# Patient Record
Sex: FEMALE | Race: ASIAN | NOT HISPANIC OR LATINO | URBAN - METROPOLITAN AREA
[De-identification: names, ages, dates, MRNs, and addresses within clinical notes are randomized per-mention and may not be internally consistent; named-entity substitution may affect disease eponyms.]

---

## 2019-11-12 ENCOUNTER — TELEPHONE (OUTPATIENT)
Dept: OBSTETRICS AND GYNECOLOGY | Facility: CLINIC | Age: 29
End: 2019-11-12

## 2019-11-12 DIAGNOSIS — N91.2 AMENORRHEA: Primary | ICD-10-CM

## 2019-11-13 ENCOUNTER — LAB VISIT (OUTPATIENT)
Dept: LAB | Facility: OTHER | Age: 29
End: 2019-11-13
Attending: NURSE PRACTITIONER
Payer: COMMERCIAL

## 2019-11-13 ENCOUNTER — OFFICE VISIT (OUTPATIENT)
Dept: OBSTETRICS AND GYNECOLOGY | Facility: CLINIC | Age: 29
End: 2019-11-13
Payer: COMMERCIAL

## 2019-11-13 VITALS
HEIGHT: 65 IN | DIASTOLIC BLOOD PRESSURE: 74 MMHG | SYSTOLIC BLOOD PRESSURE: 114 MMHG | BODY MASS INDEX: 28.87 KG/M2 | WEIGHT: 173.31 LBS

## 2019-11-13 DIAGNOSIS — Z32.01 POSITIVE PREGNANCY TEST: ICD-10-CM

## 2019-11-13 DIAGNOSIS — Z36.82 ENCOUNTER FOR (NT) NUCHAL TRANSLUCENCY SCAN: ICD-10-CM

## 2019-11-13 DIAGNOSIS — N91.5 OLIGOMENORRHEA, UNSPECIFIED TYPE: Primary | ICD-10-CM

## 2019-11-13 DIAGNOSIS — N91.5 OLIGOMENORRHEA, UNSPECIFIED TYPE: ICD-10-CM

## 2019-11-13 LAB
ABO + RH BLD: NORMAL
BASOPHILS # BLD AUTO: 0.04 K/UL (ref 0–0.2)
BASOPHILS NFR BLD: 0.5 % (ref 0–1.9)
BLD GP AB SCN CELLS X3 SERPL QL: NORMAL
DIFFERENTIAL METHOD: ABNORMAL
EOSINOPHIL # BLD AUTO: 0.1 K/UL (ref 0–0.5)
EOSINOPHIL NFR BLD: 1.6 % (ref 0–8)
ERYTHROCYTE [DISTWIDTH] IN BLOOD BY AUTOMATED COUNT: 19.7 % (ref 11.5–14.5)
HCG INTACT+B SERPL-ACNC: 206 MIU/ML
HCT VFR BLD AUTO: 38.9 % (ref 37–48.5)
HGB BLD-MCNC: 12 G/DL (ref 12–16)
IMM GRANULOCYTES # BLD AUTO: 0.03 K/UL (ref 0–0.04)
IMM GRANULOCYTES NFR BLD AUTO: 0.4 % (ref 0–0.5)
LYMPHOCYTES # BLD AUTO: 2.7 K/UL (ref 1–4.8)
LYMPHOCYTES NFR BLD: 33.7 % (ref 18–48)
MCH RBC QN AUTO: 17.6 PG (ref 27–31)
MCHC RBC AUTO-ENTMCNC: 30.8 G/DL (ref 32–36)
MCV RBC AUTO: 57 FL (ref 82–98)
MONOCYTES # BLD AUTO: 0.5 K/UL (ref 0.3–1)
MONOCYTES NFR BLD: 6.2 % (ref 4–15)
NEUTROPHILS # BLD AUTO: 4.7 K/UL (ref 1.8–7.7)
NEUTROPHILS NFR BLD: 57.6 % (ref 38–73)
NRBC BLD-RTO: 0 /100 WBC
PLATELET # BLD AUTO: 259 K/UL (ref 150–350)
PMV BLD AUTO: ABNORMAL FL (ref 9.2–12.9)
RBC # BLD AUTO: 6.81 M/UL (ref 4–5.4)
WBC # BLD AUTO: 8.13 K/UL (ref 3.9–12.7)

## 2019-11-13 PROCEDURE — 86703 HIV-1/HIV-2 1 RESULT ANTBDY: CPT

## 2019-11-13 PROCEDURE — 87340 HEPATITIS B SURFACE AG IA: CPT

## 2019-11-13 PROCEDURE — 99204 PR OFFICE/OUTPT VISIT, NEW, LEVL IV, 45-59 MIN: ICD-10-PCS | Mod: S$GLB,,, | Performed by: NURSE PRACTITIONER

## 2019-11-13 PROCEDURE — 99204 OFFICE O/P NEW MOD 45 MIN: CPT | Mod: S$GLB,,, | Performed by: NURSE PRACTITIONER

## 2019-11-13 PROCEDURE — 85025 COMPLETE CBC W/AUTO DIFF WBC: CPT

## 2019-11-13 PROCEDURE — 99999 PR PBB SHADOW E&M-EST. PATIENT-LVL III: ICD-10-PCS | Mod: PBBFAC,,, | Performed by: NURSE PRACTITIONER

## 2019-11-13 PROCEDURE — 86850 RBC ANTIBODY SCREEN: CPT

## 2019-11-13 PROCEDURE — 86762 RUBELLA ANTIBODY: CPT

## 2019-11-13 PROCEDURE — 86592 SYPHILIS TEST NON-TREP QUAL: CPT

## 2019-11-13 PROCEDURE — 99999 PR PBB SHADOW E&M-EST. PATIENT-LVL III: CPT | Mod: PBBFAC,,, | Performed by: NURSE PRACTITIONER

## 2019-11-13 PROCEDURE — 87491 CHLMYD TRACH DNA AMP PROBE: CPT

## 2019-11-13 PROCEDURE — 84702 CHORIONIC GONADOTROPIN TEST: CPT

## 2019-11-13 PROCEDURE — 36415 COLL VENOUS BLD VENIPUNCTURE: CPT

## 2019-11-13 NOTE — PROGRESS NOTES
"  CC: Positive Pregnancy Test    HISTORY OF PRESENT ILLNESS:    Lorna Mcdonnell is a 29 y.o. female, ,  Presents today for a routine exam complaining of amenorrhea and positive home urine pregnancy test.  Patient's last menstrual period was 10/01/2019 (approximate).   She is not currently on any contraception.  Reports nausea. Reports breast tenderness. Denies vaginal bleeding and pelvic pain.  Pt with h/o hidradenitis of axilla.   Prior  X 1 - full term/ uncomplicated pregnancy. Delivered at [facility].   Works in [employer] for [employer].       ROS:  GENERAL: No weight changes. No swelling. No fatigue. No fever.  CARDIOVASCULAR: No chest pain. No shortness of breath. No leg cramps.   NEUROLOGICAL: No headaches. No vision changes.  BREASTS: No pain. No lumps. No discharge.  ABDOMEN: No pain. No diarrhea. No constipation.  REPRODUCTIVE: No abnormal bleeding.   VULVA: No pain. No lesions. No itching.  VAGINA: No relaxation. No itching. No odor. No discharge. No lesions.  URINARY: No incontinence. No nocturia. No frequency. No dysuria.    MEDICATIONS AND ALLERGIES:  Reviewed        COMPREHENSIVE GYN HISTORY:  PAP History: Denies abnormal Paps.  Infection History: Denies STDs. Denies PID.  Benign History: Denies uterine fibroids. Denies ovarian cysts. Denies endometriosis. Denies other conditions.  Cancer History: Denies cervical cancer. Denies uterine cancer or hyperplasia. Denies ovarian cancer. Denies vulvar cancer or pre-cancer. Denies vaginal cancer or pre-cancer. Denies breast cancer. Denies colon cancer.  Sexual Activity History: Reports currently being sexually active  Menstrual History: None.  Contraception: None    /74 (BP Location: Right arm, Patient Position: Sitting)   Ht 5' 5" (1.651 m)   Wt 78.6 kg (173 lb 4.5 oz)   LMP 10/01/2019 (Approximate)   BMI 28.84 kg/m²     PE:  AFFECT: Calm, alert and oriented X 3. Interactive during exam  GENERAL: Appears well-nourished, well-developed, in no " acute distress.  HEAD: Normocephalic, atruamatic  TEETH: Good dentition.  THYROID: No thyromegally   BREASTS: No masses, skin changes, nipple discharge or adenopathy bilaterally.  SKIN: Normal for race, warm, & dry. No lesions or rashes.  LUNGS: Easy and unlabored, clear to auscultation bilaterally.  HEART: Regular rate and rhythm   ABDOMEN: Soft and nontender without masses or organomegally.  VULVA: No lesions, masses or tenderness.  VAGINA: Moist and well rugated without lesions or discharge.  CERVIX: Moist and pink without lesions, discharge or tenderness.      UTERUS SIZE: 6 week size, nontender and without masses.  ADNEXA: No masses or tenderness.  ESTIMATE OF PELVIC CAPACITY: Adequate  EXTREMITIES: No cyanosis, clubbing or edema. No calf tenderness.  LYMPH NODES: No axillary or inguinal adenopathy.    PROCEDURES:  UPT Positive  Genprobe  Pap      ASSESSMENT/PLAN:  Amenorrhea  Positive urine pregnancy test (CHEY: unknown)    -  Routine prenatal care    Nausea and vomiting in pregnancy    -  Education regarding lifestyle and dietary modifications    -  Advised use of B6/Unisom. Pt will notify us if no relief/worsening symptoms, will consider Zofran if needed.      1st TRIMESTER COUNSELING: Discussed all, booklet provided:  Common complaints of pregnancy  HIV and other routine prenatal tests including  genetic screening  Risk factors identified by prenatal history  Oriented to practice - discussed anticipated course of prenatal care & indications for Ultrasound  Childbirth classes/Hospital facilities   Nutrition and weight gain counseling  Toxoplasmosis precautions (Cats/Raw Meat)  Sexual activity and exercise  Environmental/Work hazards  Travel  Tobacco (Ask, Advise, Assess, Assist, and Arrange), as well as alcohol and drug use  Use of any medications (Including supplements, Vitamins, Herbs, or OTC Drugs)  Domestic violence  Seat belt use      TERATOLOGY COUNSELING:   Discussed indications and options  for aneuploidy screening - pamphlets given    -  Pt desires NT and orders placed    Dating US next week   FOLLOW-UP in 4 weeks with Dr. Isidra Michael, NP    OB/GYN

## 2019-11-14 ENCOUNTER — TELEPHONE (OUTPATIENT)
Dept: OBSTETRICS AND GYNECOLOGY | Facility: CLINIC | Age: 29
End: 2019-11-14

## 2019-11-14 DIAGNOSIS — N91.5 OLIGOMENORRHEA, UNSPECIFIED TYPE: Primary | ICD-10-CM

## 2019-11-14 DIAGNOSIS — Z32.01 POSITIVE PREGNANCY TEST: ICD-10-CM

## 2019-11-14 LAB
HBV SURFACE AG SERPL QL IA: NEGATIVE
HIV 1+2 AB+HIV1 P24 AG SERPL QL IA: NEGATIVE
RPR SER QL: NORMAL
RUBV IGG SER-ACNC: 24.4 IU/ML
RUBV IGG SER-IMP: REACTIVE

## 2019-11-15 LAB
C TRACH DNA SPEC QL NAA+PROBE: NOT DETECTED
N GONORRHOEA DNA SPEC QL NAA+PROBE: NOT DETECTED

## 2019-11-22 ENCOUNTER — TELEPHONE (OUTPATIENT)
Dept: OBSTETRICS AND GYNECOLOGY | Facility: CLINIC | Age: 29
End: 2019-11-22

## 2019-11-22 ENCOUNTER — LAB VISIT (OUTPATIENT)
Dept: LAB | Facility: OTHER | Age: 29
End: 2019-11-22
Payer: COMMERCIAL

## 2019-11-22 ENCOUNTER — PROCEDURE VISIT (OUTPATIENT)
Dept: OBSTETRICS AND GYNECOLOGY | Facility: CLINIC | Age: 29
End: 2019-11-22
Payer: COMMERCIAL

## 2019-11-22 DIAGNOSIS — N91.2 AMENORRHEA: ICD-10-CM

## 2019-11-22 DIAGNOSIS — O20.0 THREATENED ABORTION: Primary | ICD-10-CM

## 2019-11-22 DIAGNOSIS — O20.0 THREATENED ABORTION: ICD-10-CM

## 2019-11-22 LAB — HCG INTACT+B SERPL-ACNC: 4316 MIU/ML

## 2019-11-22 PROCEDURE — 36415 COLL VENOUS BLD VENIPUNCTURE: CPT

## 2019-11-22 PROCEDURE — 76801 OB US < 14 WKS SINGLE FETUS: CPT | Mod: S$GLB,,, | Performed by: OBSTETRICS & GYNECOLOGY

## 2019-11-22 PROCEDURE — 84702 CHORIONIC GONADOTROPIN TEST: CPT

## 2019-11-22 PROCEDURE — 76801 PR US, OB <14WKS, TRANSABD, SINGLE GESTATION: ICD-10-PCS | Mod: S$GLB,,, | Performed by: OBSTETRICS & GYNECOLOGY

## 2019-11-22 NOTE — TELEPHONE ENCOUNTER
----- Message from Natalie Saenz sent at 11/22/2019  4:49 PM CST -----  Contact: ALAN FREEMAN [[id number]]   Name of Who is Calling:     What is the request in detail:  Patient request call back  State she has another question Please contact to further discuss and advise      Can the clinic reply by [facility]: no     What Number to Call Back if not in [facility]:

## 2019-11-22 NOTE — TELEPHONE ENCOUNTER
----- Message from Martita Bhakta sent at 11/22/2019  3:43 PM CST -----  Contact: ALAN FREEMAN   Type:  Patient Returning Call    Who Called: ALAN FREEMAN     Who Left Message for Patient: Janet Patiño MA    Does the patient know what this is regarding?: NO    Best Call Back Number:[phone]    Additional Information:

## 2019-11-22 NOTE — TELEPHONE ENCOUNTER
"Per Becka NP, "US had a gestational sac with no heartbeat.  Hcg level did increase from last week.  We need more info to determine the status of the pregnancy."    Patient is already scheduled for her follow up ultrasound and wanted to know if she needed another HCG drawn. Patient advised that the note only called for a follow up ultrasound. Patient verbalized understanding.     "

## 2019-12-03 ENCOUNTER — PROCEDURE VISIT (OUTPATIENT)
Dept: OBSTETRICS AND GYNECOLOGY | Facility: CLINIC | Age: 29
End: 2019-12-03
Payer: COMMERCIAL

## 2019-12-03 DIAGNOSIS — O20.0 THREATENED ABORTION: ICD-10-CM

## 2019-12-03 PROCEDURE — 76801 OB US < 14 WKS SINGLE FETUS: CPT | Mod: S$GLB,,, | Performed by: OBSTETRICS & GYNECOLOGY

## 2019-12-03 PROCEDURE — 76801 PR US, OB <14WKS, TRANSABD, SINGLE GESTATION: ICD-10-PCS | Mod: S$GLB,,, | Performed by: OBSTETRICS & GYNECOLOGY

## 2019-12-19 ENCOUNTER — ROUTINE PRENATAL (OUTPATIENT)
Dept: OBSTETRICS AND GYNECOLOGY | Facility: CLINIC | Age: 29
End: 2019-12-19
Payer: COMMERCIAL

## 2019-12-19 VITALS
BODY MASS INDEX: 28.69 KG/M2 | DIASTOLIC BLOOD PRESSURE: 80 MMHG | WEIGHT: 172.38 LBS | SYSTOLIC BLOOD PRESSURE: 104 MMHG

## 2019-12-19 DIAGNOSIS — Z86.39 HISTORY OF HYPOTHYROIDISM: ICD-10-CM

## 2019-12-19 DIAGNOSIS — O21.9 NAUSEA AND VOMITING IN PREGNANCY: Primary | ICD-10-CM

## 2019-12-19 DIAGNOSIS — O26.891 PREGNANCY HEADACHE IN FIRST TRIMESTER: ICD-10-CM

## 2019-12-19 DIAGNOSIS — R51.9 PREGNANCY HEADACHE IN FIRST TRIMESTER: ICD-10-CM

## 2019-12-19 PROCEDURE — 99999 PR PBB SHADOW E&M-EST. PATIENT-LVL II: CPT | Mod: PBBFAC,,, | Performed by: OBSTETRICS & GYNECOLOGY

## 2019-12-19 PROCEDURE — 99999 PR PBB SHADOW E&M-EST. PATIENT-LVL II: ICD-10-PCS | Mod: PBBFAC,,, | Performed by: OBSTETRICS & GYNECOLOGY

## 2019-12-19 RX ORDER — ONDANSETRON 4 MG/1
4 TABLET, FILM COATED ORAL DAILY PRN
Qty: 30 TABLET | Refills: 2 | Status: SHIPPED | OUTPATIENT
Start: 2019-12-19 | End: 2020-12-18

## 2019-12-19 RX ORDER — BUTALBITAL, ACETAMINOPHEN AND CAFFEINE 50; 325; 40 MG/1; MG/1; MG/1
1 TABLET ORAL EVERY 4 HOURS PRN
Qty: 30 TABLET | Refills: 0 | Status: SHIPPED | OUTPATIENT
Start: 2019-12-19 | End: 2020-01-18

## 2019-12-19 NOTE — PROGRESS NOTES
Pt is a  who is here for initial OB visit.  Pt reports doing ok.  Reports that she is having headaches often, not relieved with tylenol.  Last pregnancy had HAs that were relieved with tylenol and caffeine.  Will give Rx for fioricet.  Pt is also having nausea.  Pt would like to try Zofran.  Rx sent.  Also discussed B6/Unisom which patient will try as well.  Pt reports hx of hypothyroidism in last pregnancy.  Had to be on synthroid.  Will check TSH today.  RTC in 4 weeks.  NT ordered for chromosome screening.

## 2019-12-26 ENCOUNTER — PATIENT MESSAGE (OUTPATIENT)
Dept: MATERNAL FETAL MEDICINE | Facility: CLINIC | Age: 29
End: 2019-12-26

## 2020-01-09 ENCOUNTER — TELEPHONE (OUTPATIENT)
Dept: OBSTETRICS AND GYNECOLOGY | Facility: CLINIC | Age: 30
End: 2020-01-09

## 2020-01-09 NOTE — TELEPHONE ENCOUNTER
----- Message from Paxton Chan, Patient Care Assistant sent at 1/9/2020 10:33 AM CST -----  Contact: ALAN FREEMAN [[id number]]  Name of Who is Calling: ALAN FREEMAN [[id number]]    What is the request in detail:Requesting to come in 01/13/20 if available for routine Ob.  Please contact to further discuss and advise      Can the clinic reply by [facility]: No    [facility]:   [phone]

## 2020-01-13 ENCOUNTER — ROUTINE PRENATAL (OUTPATIENT)
Dept: OBSTETRICS AND GYNECOLOGY | Facility: CLINIC | Age: 30
End: 2020-01-13
Payer: COMMERCIAL

## 2020-01-13 VITALS
DIASTOLIC BLOOD PRESSURE: 74 MMHG | SYSTOLIC BLOOD PRESSURE: 104 MMHG | BODY MASS INDEX: 27.77 KG/M2 | WEIGHT: 166.88 LBS

## 2020-01-13 DIAGNOSIS — O99.612 CONSTIPATION DURING PREGNANCY IN SECOND TRIMESTER: ICD-10-CM

## 2020-01-13 DIAGNOSIS — K59.00 CONSTIPATION DURING PREGNANCY IN SECOND TRIMESTER: ICD-10-CM

## 2020-01-13 DIAGNOSIS — Z34.82 ENCOUNTER FOR SUPERVISION OF OTHER NORMAL PREGNANCY IN SECOND TRIMESTER: Primary | ICD-10-CM

## 2020-01-13 PROCEDURE — 99999 PR PBB SHADOW E&M-EST. PATIENT-LVL II: ICD-10-PCS | Mod: PBBFAC,,, | Performed by: OBSTETRICS & GYNECOLOGY

## 2020-01-13 PROCEDURE — 99999 PR PBB SHADOW E&M-EST. PATIENT-LVL II: CPT | Mod: PBBFAC,,, | Performed by: OBSTETRICS & GYNECOLOGY

## 2020-01-13 RX ORDER — DOCUSATE SODIUM 100 MG/1
100 CAPSULE, LIQUID FILLED ORAL 2 TIMES DAILY
Qty: 60 CAPSULE | Refills: 1 | Status: SHIPPED | OUTPATIENT
Start: 2020-01-13 | End: 2021-01-12

## 2020-01-13 NOTE — PROGRESS NOTES
Pt reports that HAs have improved and resolved.  Notes that nausea is improving.  Pt using Bonjesta PRN.  Rarely using Zofran.  Notes constipation.  Rx for Colace BID given.  Otherwise doing well.  RTC in 4 weeks,.  Will do connected mom

## 2020-01-16 ENCOUNTER — PROCEDURE VISIT (OUTPATIENT)
Dept: MATERNAL FETAL MEDICINE | Facility: CLINIC | Age: 30
End: 2020-01-16
Payer: COMMERCIAL

## 2020-01-16 ENCOUNTER — LAB VISIT (OUTPATIENT)
Dept: LAB | Facility: OTHER | Age: 30
End: 2020-01-16
Attending: OBSTETRICS & GYNECOLOGY
Payer: COMMERCIAL

## 2020-01-16 VITALS — WEIGHT: 165.81 LBS | BODY MASS INDEX: 27.59 KG/M2

## 2020-01-16 DIAGNOSIS — Z36.9 ENCOUNTER FOR ANTENATAL SCREENING: ICD-10-CM

## 2020-01-16 DIAGNOSIS — Z86.39 HISTORY OF HYPOTHYROIDISM: ICD-10-CM

## 2020-01-16 DIAGNOSIS — Z36.89 ENCOUNTER FOR FETAL ANATOMIC SURVEY: Primary | ICD-10-CM

## 2020-01-16 DIAGNOSIS — Z36.82 ENCOUNTER FOR (NT) NUCHAL TRANSLUCENCY SCAN: ICD-10-CM

## 2020-01-16 LAB — TSH SERPL DL<=0.005 MIU/L-ACNC: 1.48 UIU/ML (ref 0.4–4)

## 2020-01-16 PROCEDURE — 76813 OB US NUCHAL MEAS 1 GEST: CPT | Mod: S$GLB,,, | Performed by: OBSTETRICS & GYNECOLOGY

## 2020-01-16 PROCEDURE — 76813 PR US, OB NUCHAL, TRANSABDOM/TRANSVAG, FIRST GESTATION: ICD-10-PCS | Mod: S$GLB,,, | Performed by: OBSTETRICS & GYNECOLOGY

## 2020-01-16 PROCEDURE — 84443 ASSAY THYROID STIM HORMONE: CPT

## 2020-01-16 PROCEDURE — 84163 PAPPA SERUM: CPT

## 2020-01-16 PROCEDURE — 36415 COLL VENOUS BLD VENIPUNCTURE: CPT

## 2020-01-20 LAB — INTEGRATED SCN PATIENT-IMP NAR: NORMAL

## 2020-01-21 ENCOUNTER — TELEPHONE (OUTPATIENT)
Dept: OBSTETRICS AND GYNECOLOGY | Facility: CLINIC | Age: 30
End: 2020-01-21

## 2020-01-21 NOTE — TELEPHONE ENCOUNTER
----- Message from Arin Robison sent at 1/21/2020 12:41 PM CST -----  Contact: ALAN FREEMAN  Name of Who is Calling: ALAN FREEMAN      What is the request in detail: Would like to speak to staff in regards to wanting to review her results due to the portal being down. Please advise.       Can the clinic reply by [facility]: No      What Number to Call Back if not in [facility]: [phone]

## 2020-01-29 NOTE — TELEPHONE ENCOUNTER
Attempted to call patient to discuss NT.  No answer.  Unable to leave voicemail.  Will try again later.

## 2020-01-31 ENCOUNTER — PATIENT MESSAGE (OUTPATIENT)
Dept: ADMINISTRATIVE | Facility: OTHER | Age: 30
End: 2020-01-31

## 2020-03-06 ENCOUNTER — PROCEDURE VISIT (OUTPATIENT)
Dept: MATERNAL FETAL MEDICINE | Facility: CLINIC | Age: 30
End: 2020-03-06
Payer: COMMERCIAL

## 2020-03-06 ENCOUNTER — LAB VISIT (OUTPATIENT)
Dept: LAB | Facility: OTHER | Age: 30
End: 2020-03-06
Attending: OBSTETRICS & GYNECOLOGY
Payer: COMMERCIAL

## 2020-03-06 ENCOUNTER — PATIENT MESSAGE (OUTPATIENT)
Dept: OBSTETRICS AND GYNECOLOGY | Facility: CLINIC | Age: 30
End: 2020-03-06

## 2020-03-06 VITALS — BODY MASS INDEX: 26.6 KG/M2 | WEIGHT: 159.81 LBS

## 2020-03-06 DIAGNOSIS — Z36.9 ENCOUNTER FOR ANTENATAL SCREENING: ICD-10-CM

## 2020-03-06 DIAGNOSIS — Z36.9 ENCOUNTER FOR ANTENATAL SCREENING: Primary | ICD-10-CM

## 2020-03-06 DIAGNOSIS — Z36.89 ENCOUNTER FOR FETAL ANATOMIC SURVEY: ICD-10-CM

## 2020-03-06 PROCEDURE — 76805 PR US, OB 14+WKS, TRANSABD, SINGLE GESTATION: ICD-10-PCS | Mod: S$GLB,,, | Performed by: OBSTETRICS & GYNECOLOGY

## 2020-03-06 PROCEDURE — 76805 OB US >/= 14 WKS SNGL FETUS: CPT | Mod: S$GLB,,, | Performed by: OBSTETRICS & GYNECOLOGY

## 2020-03-06 PROCEDURE — 36415 COLL VENOUS BLD VENIPUNCTURE: CPT

## 2020-03-09 LAB
# FETUSES US: NORMAL
AFP MOM SERPL: 1.07
AFP SERPL-MCNC: 67.1 NG/ML
AGE AT DELIVERY: 29
B-HCG MOM SERPL: 1.25
B-HCG SERPL-ACNC: 20.4 IU/ML
COLLECT DATE BLD: NORMAL
COLLECT DATE: NORMAL
FET TS 21 RISK FROM MAT AGE: NORMAL
GA (DAYS): 4 D
GA (WEEKS): 13 WK
GA METHOD: NORMAL
GEST. AGE (DAYS) 2ND SAMPLE (SI2): 5
GEST. AGE (WKS) 2ND SAMPLE (SI2): 20
IDDM PATIENT QL: NORMAL
INHIBIN A MOM SERPL: 1.08
INHIBIN A SERPL-MCNC: 202.4 PG/ML
INTEGRATED SCN PATIENT-IMP NAR: NORMAL
INTEGRATED SCN PATIENT-IMP: NEGATIVE
PAPP-A MOM SERPL: 0.86
PAPP-A SERPL-MCNC: NORMAL NG/ML
SMOKING STATUS FTND: NO
TS 18 RISK FETUS: NORMAL
TS 21 RISK FETUS: NORMAL
U ESTRIOL MOM SERPL: 0.7
U ESTRIOL SERPL-MCNC: 1.66 NG/ML

## 2020-03-26 ENCOUNTER — PATIENT MESSAGE (OUTPATIENT)
Dept: OBSTETRICS AND GYNECOLOGY | Facility: CLINIC | Age: 30
End: 2020-03-26

## 2020-03-27 ENCOUNTER — TELEPHONE (OUTPATIENT)
Dept: OBSTETRICS AND GYNECOLOGY | Facility: CLINIC | Age: 30
End: 2020-03-27

## 2020-03-27 DIAGNOSIS — Z34.82 ENCOUNTER FOR SUPERVISION OF OTHER NORMAL PREGNANCY IN SECOND TRIMESTER: Primary | ICD-10-CM

## 2020-03-27 NOTE — TELEPHONE ENCOUNTER
----- Message from Barbra Coyle sent at 3/27/2020 12:28 PM CDT -----  Contact: ALAN FREEMAN [[id number]]  Name of Who is Calling: ALAN FREEMAN [[id number]]      What is the request in detail: patient would like orders for glucose testing. Please call      Can the clinic reply by [facility]: yes    What Number to Call Back if not in [facility]: [phone] (home)

## 2020-03-30 ENCOUNTER — PATIENT MESSAGE (OUTPATIENT)
Dept: OBSTETRICS AND GYNECOLOGY | Facility: CLINIC | Age: 30
End: 2020-03-30

## 2020-03-31 ENCOUNTER — TELEPHONE (OUTPATIENT)
Dept: OBSTETRICS AND GYNECOLOGY | Facility: CLINIC | Age: 30
End: 2020-03-31

## 2020-03-31 ENCOUNTER — OFFICE VISIT (OUTPATIENT)
Dept: OBSTETRICS AND GYNECOLOGY | Facility: CLINIC | Age: 30
End: 2020-03-31
Payer: COMMERCIAL

## 2020-03-31 VITALS — SYSTOLIC BLOOD PRESSURE: 105 MMHG | BODY MASS INDEX: 26.79 KG/M2 | WEIGHT: 161 LBS | DIASTOLIC BLOOD PRESSURE: 65 MMHG

## 2020-03-31 DIAGNOSIS — Z34.82 ENCOUNTER FOR SUPERVISION OF OTHER NORMAL PREGNANCY IN SECOND TRIMESTER: Primary | ICD-10-CM

## 2020-03-31 NOTE — TELEPHONE ENCOUNTER
Attempted to contact patient regarding having virtual visit earlier around 11 am -1pm. Phone went straight vm without ringing. Voicemail left. MyChart sent as well.

## 2020-04-18 ENCOUNTER — PATIENT MESSAGE (OUTPATIENT)
Dept: OBSTETRICS AND GYNECOLOGY | Facility: CLINIC | Age: 30
End: 2020-04-18

## 2020-04-24 ENCOUNTER — CLINICAL SUPPORT (OUTPATIENT)
Dept: OBSTETRICS AND GYNECOLOGY | Facility: CLINIC | Age: 30
End: 2020-04-24
Payer: COMMERCIAL

## 2020-04-24 ENCOUNTER — LAB VISIT (OUTPATIENT)
Dept: LAB | Facility: OTHER | Age: 30
End: 2020-04-24
Attending: OBSTETRICS & GYNECOLOGY
Payer: COMMERCIAL

## 2020-04-24 ENCOUNTER — ROUTINE PRENATAL (OUTPATIENT)
Dept: OBSTETRICS AND GYNECOLOGY | Facility: CLINIC | Age: 30
End: 2020-04-24
Payer: COMMERCIAL

## 2020-04-24 VITALS — WEIGHT: 169.75 LBS | SYSTOLIC BLOOD PRESSURE: 98 MMHG | DIASTOLIC BLOOD PRESSURE: 64 MMHG | BODY MASS INDEX: 28.25 KG/M2

## 2020-04-24 DIAGNOSIS — Z34.82 ENCOUNTER FOR SUPERVISION OF OTHER NORMAL PREGNANCY IN SECOND TRIMESTER: ICD-10-CM

## 2020-04-24 DIAGNOSIS — O92.5 LACTATING, SUPPRESSED: Primary | ICD-10-CM

## 2020-04-24 DIAGNOSIS — Z34.80 SUPERVISION OF OTHER NORMAL PREGNANCY, ANTEPARTUM: ICD-10-CM

## 2020-04-24 LAB
BASOPHILS # BLD AUTO: 0.03 K/UL (ref 0–0.2)
BASOPHILS NFR BLD: 0.3 % (ref 0–1.9)
DIFFERENTIAL METHOD: ABNORMAL
EOSINOPHIL # BLD AUTO: 0.1 K/UL (ref 0–0.5)
EOSINOPHIL NFR BLD: 0.8 % (ref 0–8)
ERYTHROCYTE [DISTWIDTH] IN BLOOD BY AUTOMATED COUNT: 16.4 % (ref 11.5–14.5)
GLUCOSE SERPL-MCNC: 87 MG/DL (ref 70–140)
HCT VFR BLD AUTO: 35.2 % (ref 37–48.5)
HGB BLD-MCNC: 10.8 G/DL (ref 12–16)
IMM GRANULOCYTES # BLD AUTO: 0.09 K/UL (ref 0–0.04)
IMM GRANULOCYTES NFR BLD AUTO: 1 % (ref 0–0.5)
LYMPHOCYTES # BLD AUTO: 2.4 K/UL (ref 1–4.8)
LYMPHOCYTES NFR BLD: 26 % (ref 18–48)
MCH RBC QN AUTO: 18.9 PG (ref 27–31)
MCHC RBC AUTO-ENTMCNC: 30.7 G/DL (ref 32–36)
MCV RBC AUTO: 62 FL (ref 82–98)
MONOCYTES # BLD AUTO: 0.5 K/UL (ref 0.3–1)
MONOCYTES NFR BLD: 5.6 % (ref 4–15)
NEUTROPHILS # BLD AUTO: 6 K/UL (ref 1.8–7.7)
NEUTROPHILS NFR BLD: 66.3 % (ref 38–73)
NRBC BLD-RTO: 0 /100 WBC
PLATELET # BLD AUTO: 295 K/UL (ref 150–350)
PMV BLD AUTO: 10.3 FL (ref 9.2–12.9)
RBC # BLD AUTO: 5.71 M/UL (ref 4–5.4)
WBC # BLD AUTO: 9.1 K/UL (ref 3.9–12.7)

## 2020-04-24 PROCEDURE — 82950 GLUCOSE TEST: CPT

## 2020-04-24 PROCEDURE — 90471 TDAP VACCINE GREATER THAN OR EQUAL TO 7YO IM: ICD-10-PCS | Mod: S$GLB,,, | Performed by: OBSTETRICS & GYNECOLOGY

## 2020-04-24 PROCEDURE — 85025 COMPLETE CBC W/AUTO DIFF WBC: CPT

## 2020-04-24 PROCEDURE — 99999 PR PBB SHADOW E&M-EST. PATIENT-LVL I: CPT | Mod: PBBFAC,,,

## 2020-04-24 PROCEDURE — 90715 TDAP VACCINE GREATER THAN OR EQUAL TO 7YO IM: ICD-10-PCS | Mod: S$GLB,,, | Performed by: OBSTETRICS & GYNECOLOGY

## 2020-04-24 PROCEDURE — 99999 PR PBB SHADOW E&M-EST. PATIENT-LVL II: CPT | Mod: PBBFAC,,, | Performed by: NURSE PRACTITIONER

## 2020-04-24 PROCEDURE — 99999 PR PBB SHADOW E&M-EST. PATIENT-LVL I: ICD-10-PCS | Mod: PBBFAC,,,

## 2020-04-24 PROCEDURE — 36415 COLL VENOUS BLD VENIPUNCTURE: CPT

## 2020-04-24 PROCEDURE — 90715 TDAP VACCINE 7 YRS/> IM: CPT | Mod: S$GLB,,, | Performed by: OBSTETRICS & GYNECOLOGY

## 2020-04-24 PROCEDURE — 99999 PR PBB SHADOW E&M-EST. PATIENT-LVL II: ICD-10-PCS | Mod: PBBFAC,,, | Performed by: NURSE PRACTITIONER

## 2020-04-24 PROCEDURE — 90471 IMMUNIZATION ADMIN: CPT | Mod: S$GLB,,, | Performed by: OBSTETRICS & GYNECOLOGY

## 2020-04-24 NOTE — PROGRESS NOTES
Here for TDAP injection. Patient without complaint of pain at this time, Injection given. Tolerated well. No pain noted after injection.  Advised to wait in lobby 15 minutes and report any adverse reactions.     Site: LD    Baby Friendly Handout Given to Patient

## 2020-04-24 NOTE — PROGRESS NOTES
Here for routine OB appt at 27w5d, with no complaints.  Reports good FM.  Denies LOF, denies VB, and reports occasional huber ruby.  Reviewed warning signs of Labor and Preeclampsia.  Daily FM counts reinforced.  Peds- Dr. Donald. Plans to breastfeed- RX for pump given.   OB glucose and CBC today.  Tdap today.   F/U scheduled 2 weeks

## 2020-04-27 ENCOUNTER — TELEPHONE (OUTPATIENT)
Dept: OBSTETRICS AND GYNECOLOGY | Facility: CLINIC | Age: 30
End: 2020-04-27

## 2020-04-27 ENCOUNTER — PATIENT MESSAGE (OUTPATIENT)
Dept: OBSTETRICS AND GYNECOLOGY | Facility: CLINIC | Age: 30
End: 2020-04-27

## 2020-04-27 NOTE — TELEPHONE ENCOUNTER
Ramses for pt. Pt needs to be rescheduled for 5/22 appt with Dr. Miner, she will be out of clinic on 5/22.

## 2020-05-04 ENCOUNTER — TELEPHONE (OUTPATIENT)
Dept: OBSTETRICS AND GYNECOLOGY | Facility: CLINIC | Age: 30
End: 2020-05-04

## 2020-05-04 NOTE — TELEPHONE ENCOUNTER
----- Message from Paxton Chan, Patient Care Assistant sent at 5/4/2020 12:43 PM CDT -----  Contact: ALAN FREEMAN [[id number]]  Type:  Patient Returning Call    Who Called: ALAN FREEMAN [[id number]]    Who Left Message for Patient: Patient wasn't sure    Does the patient know what this is regarding?:Yes    Best Call Back Number:[phone]    Additional Information:   Requesting a call back in regards of if doctor will be out of office the the whole day 05/22 for routine Ob.

## 2020-05-21 ENCOUNTER — OFFICE VISIT (OUTPATIENT)
Dept: OBSTETRICS AND GYNECOLOGY | Facility: CLINIC | Age: 30
End: 2020-05-21
Payer: COMMERCIAL

## 2020-05-21 VITALS — BODY MASS INDEX: 28.29 KG/M2 | WEIGHT: 170 LBS | DIASTOLIC BLOOD PRESSURE: 57 MMHG | SYSTOLIC BLOOD PRESSURE: 111 MMHG

## 2020-05-21 DIAGNOSIS — Z34.83 ENCOUNTER FOR SUPERVISION OF OTHER NORMAL PREGNANCY IN THIRD TRIMESTER: Primary | ICD-10-CM

## 2020-05-21 NOTE — PROGRESS NOTES
The patient location is: home  The chief complaint leading to consultation is: routine OB    Visit type: audiovisual    Face to Face time with patient: 15 minutes of total time spent on the encounter, which includes face to face time and non-face to face time preparing to see the patient (eg, review of tests), Obtaining and/or reviewing separately obtained history, Documenting clinical information in the electronic or other health record, Independently interpreting results (not separately reported) and communicating results to the patient/family/caregiver, or Care coordination (not separately reported).         Each patient to whom he or she provides medical services by telemedicine is:  (1) informed of the relationship between the physician and patient and the respective role of any other health care provider with respect to management of the patient; and (2) notified that he or she may decline to receive medical services by telemedicine and may withdraw from such care at any time.      Pt doing well.  Denies any issues at this time.  Denies regular CTX, VB, VD, LOF.  + FM.  Continue PNV.  Labor precautions reviewed.  Pt to RTC in 2-3 weeks.  Does not have childcare at this time so cannot come in for visit with visitor policy.  Would like to do next visit as virtual visit if possible.  Will set up.  
Statement Selected

## 2020-06-03 ENCOUNTER — PATIENT MESSAGE (OUTPATIENT)
Dept: OBSTETRICS AND GYNECOLOGY | Facility: CLINIC | Age: 30
End: 2020-06-03

## 2020-06-03 ENCOUNTER — TELEPHONE (OUTPATIENT)
Dept: OBSTETRICS AND GYNECOLOGY | Facility: CLINIC | Age: 30
End: 2020-06-03

## 2020-06-03 NOTE — TELEPHONE ENCOUNTER
----- Message from Aniya Herman sent at 6/3/2020  8:46 AM CDT -----  Contact: Patient   Patient has an appt on 6/9/2020 for 1pm Patient is requesting if the appt can be moved up to 11am or can she change it to next day. Patient would like to speak w/ staff. Patient contact number [phone]

## 2020-06-03 NOTE — TELEPHONE ENCOUNTER
Spoke to patient. She has a meeting that ends at 11:30 and another at 1:00 pm. Patient requesting to move virtual visit to 11:40 am. Patient advised that she can be seen for virtual visit earlier at 11:45 am.

## 2020-06-09 ENCOUNTER — OFFICE VISIT (OUTPATIENT)
Dept: OBSTETRICS AND GYNECOLOGY | Facility: CLINIC | Age: 30
End: 2020-06-09
Payer: COMMERCIAL

## 2020-06-09 DIAGNOSIS — Z34.83 ENCOUNTER FOR SUPERVISION OF OTHER NORMAL PREGNANCY IN THIRD TRIMESTER: Primary | ICD-10-CM

## 2020-06-09 PROBLEM — Z34.93 ENCOUNTER FOR SUPERVISION OF NORMAL PREGNANCY IN THIRD TRIMESTER: Status: ACTIVE | Noted: 2020-06-09

## 2020-06-09 PROCEDURE — 59426 ANTEPARTUM CARE ONLY: CPT | Mod: S$GLB,,, | Performed by: OBSTETRICS & GYNECOLOGY

## 2020-06-09 PROCEDURE — 59426 PR ANTEPARTUM CARE ONLY, >7 VISITS: ICD-10-PCS | Mod: S$GLB,,, | Performed by: OBSTETRICS & GYNECOLOGY

## 2020-06-09 NOTE — PROGRESS NOTES
The patient location is: routine OB  The chief complaint leading to consultation is: 15 min    Visit type: audiovisual    Face to Face time with patient: 18 minutes of total time spent on the encounter, which includes face to face time and non-face to face time preparing to see the patient (eg, review of tests), Obtaining and/or reviewing separately obtained history, Documenting clinical information in the electronic or other health record, Independently interpreting results (not separately reported) and communicating results to the patient/family/caregiver, or Care coordination (not separately reported).         Each patient to whom he or she provides medical services by telemedicine is:  (1) informed of the relationship between the physician and patient and the respective role of any other health care provider with respect to management of the patient; and (2) notified that he or she may decline to receive medical services by telemedicine and may withdraw from such care at any time.    Notes:     Pt doing well.  Denies any issues at this time.  Denies regular CTX, VB, VD, LOF.  + FM.  Continue PNV.  Labor precautions reviewed.  Pt to RTC in 2 week.  Having some heartburn that is controlled.  Taking Fe supplementation.  Plan for 3rd trim labwork and GBS at next visit.

## 2020-06-24 ENCOUNTER — ROUTINE PRENATAL (OUTPATIENT)
Dept: OBSTETRICS AND GYNECOLOGY | Facility: CLINIC | Age: 30
End: 2020-06-24
Payer: COMMERCIAL

## 2020-06-24 VITALS
BODY MASS INDEX: 29.97 KG/M2 | SYSTOLIC BLOOD PRESSURE: 104 MMHG | WEIGHT: 180.13 LBS | DIASTOLIC BLOOD PRESSURE: 72 MMHG

## 2020-06-24 DIAGNOSIS — Z34.83 ENCOUNTER FOR SUPERVISION OF OTHER NORMAL PREGNANCY IN THIRD TRIMESTER: Primary | ICD-10-CM

## 2020-06-24 DIAGNOSIS — L02.419 AXILLARY ABSCESS: ICD-10-CM

## 2020-06-24 PROCEDURE — 10060 I&D ABSCESS SIMPLE/SINGLE: CPT | Mod: S$GLB,,, | Performed by: OBSTETRICS & GYNECOLOGY

## 2020-06-24 PROCEDURE — 87081 CULTURE SCREEN ONLY: CPT

## 2020-06-24 PROCEDURE — 99999 PR PBB SHADOW E&M-EST. PATIENT-LVL III: CPT | Mod: PBBFAC,,, | Performed by: OBSTETRICS & GYNECOLOGY

## 2020-06-24 PROCEDURE — 10060 PR DRAIN SKIN ABSCESS SIMPLE: ICD-10-PCS | Mod: S$GLB,,, | Performed by: OBSTETRICS & GYNECOLOGY

## 2020-06-24 PROCEDURE — 99999 PR PBB SHADOW E&M-EST. PATIENT-LVL III: ICD-10-PCS | Mod: PBBFAC,,, | Performed by: OBSTETRICS & GYNECOLOGY

## 2020-06-24 RX ORDER — CLINDAMYCIN HYDROCHLORIDE 300 MG/1
300 CAPSULE ORAL EVERY 6 HOURS
Qty: 40 CAPSULE | Refills: 0 | Status: SHIPPED | OUTPATIENT
Start: 2020-06-24 | End: 2020-07-04

## 2020-06-26 ENCOUNTER — HOSPITAL ENCOUNTER (EMERGENCY)
Facility: OTHER | Age: 30
Discharge: HOME | End: 2020-06-26
Attending: OBSTETRICS & GYNECOLOGY
Payer: COMMERCIAL

## 2020-06-26 VITALS
HEART RATE: 90 BPM | RESPIRATION RATE: 16 BRPM | OXYGEN SATURATION: 99 % | TEMPERATURE: 98 F | SYSTOLIC BLOOD PRESSURE: 105 MMHG | DIASTOLIC BLOOD PRESSURE: 70 MMHG

## 2020-06-26 DIAGNOSIS — F41.9 ANXIETY: Primary | ICD-10-CM

## 2020-06-26 DIAGNOSIS — O26.893 SHORTNESS OF BREATH DUE TO PREGNANCY IN THIRD TRIMESTER: ICD-10-CM

## 2020-06-26 DIAGNOSIS — R06.02 SHORTNESS OF BREATH DUE TO PREGNANCY IN THIRD TRIMESTER: ICD-10-CM

## 2020-06-26 DIAGNOSIS — Z3A.36 PREGNANCY WITH 36 COMPLETED WEEKS GESTATION: ICD-10-CM

## 2020-06-26 DIAGNOSIS — R00.2 PALPITATIONS: ICD-10-CM

## 2020-06-26 LAB
ABO + RH BLD: NORMAL
ALBUMIN SERPL BCP-MCNC: 2.7 G/DL (ref 3.5–5.2)
ALP SERPL-CCNC: 72 U/L (ref 55–135)
ALT SERPL W/O P-5'-P-CCNC: 8 U/L (ref 10–44)
ANION GAP SERPL CALC-SCNC: 10 MMOL/L (ref 8–16)
AST SERPL-CCNC: 14 U/L (ref 10–40)
BASOPHILS # BLD AUTO: 0.01 K/UL (ref 0–0.2)
BASOPHILS NFR BLD: 0.2 % (ref 0–1.9)
BILIRUB SERPL-MCNC: 0.6 MG/DL (ref 0.1–1)
BLD GP AB SCN CELLS X3 SERPL QL: NORMAL
BUN SERPL-MCNC: 8 MG/DL (ref 6–20)
CALCIUM SERPL-MCNC: 8.9 MG/DL (ref 8.7–10.5)
CHLORIDE SERPL-SCNC: 109 MMOL/L (ref 95–110)
CO2 SERPL-SCNC: 19 MMOL/L (ref 23–29)
CREAT SERPL-MCNC: 0.5 MG/DL (ref 0.5–1.4)
DIFFERENTIAL METHOD: ABNORMAL
EOSINOPHIL # BLD AUTO: 0 K/UL (ref 0–0.5)
EOSINOPHIL NFR BLD: 0.6 % (ref 0–8)
ERYTHROCYTE [DISTWIDTH] IN BLOOD BY AUTOMATED COUNT: 15.9 % (ref 11.5–14.5)
EST. GFR  (AFRICAN AMERICAN): >60 ML/MIN/1.73 M^2
EST. GFR  (NON AFRICAN AMERICAN): >60 ML/MIN/1.73 M^2
GLUCOSE SERPL-MCNC: 106 MG/DL (ref 70–110)
HCT VFR BLD AUTO: 32.7 % (ref 37–48.5)
HGB BLD-MCNC: 10.1 G/DL (ref 12–16)
IMM GRANULOCYTES # BLD AUTO: 0.07 K/UL (ref 0–0.04)
IMM GRANULOCYTES NFR BLD AUTO: 1.1 % (ref 0–0.5)
LYMPHOCYTES # BLD AUTO: 2 K/UL (ref 1–4.8)
LYMPHOCYTES NFR BLD: 31 % (ref 18–48)
MCH RBC QN AUTO: 18.7 PG (ref 27–31)
MCHC RBC AUTO-ENTMCNC: 30.9 G/DL (ref 32–36)
MCV RBC AUTO: 60 FL (ref 82–98)
MONOCYTES # BLD AUTO: 0.3 K/UL (ref 0.3–1)
MONOCYTES NFR BLD: 5.2 % (ref 4–15)
NEUTROPHILS # BLD AUTO: 4 K/UL (ref 1.8–7.7)
NEUTROPHILS NFR BLD: 61.9 % (ref 38–73)
NRBC BLD-RTO: 0 /100 WBC
PLATELET # BLD AUTO: 243 K/UL (ref 150–350)
PMV BLD AUTO: 10.8 FL (ref 9.2–12.9)
POTASSIUM SERPL-SCNC: 3.5 MMOL/L (ref 3.5–5.1)
PROT SERPL-MCNC: 7 G/DL (ref 6–8.4)
RBC # BLD AUTO: 5.41 M/UL (ref 4–5.4)
SARS-COV-2 RDRP RESP QL NAA+PROBE: NEGATIVE
SODIUM SERPL-SCNC: 138 MMOL/L (ref 136–145)
TROPONIN I SERPL DL<=0.01 NG/ML-MCNC: 0.01 NG/ML (ref 0–0.03)
WBC # BLD AUTO: 6.51 K/UL (ref 3.9–12.7)

## 2020-06-26 PROCEDURE — 84484 ASSAY OF TROPONIN QUANT: CPT

## 2020-06-26 PROCEDURE — 85025 COMPLETE CBC W/AUTO DIFF WBC: CPT

## 2020-06-26 PROCEDURE — 99284 EMERGENCY DEPT VISIT MOD MDM: CPT | Mod: 25

## 2020-06-26 PROCEDURE — 36415 COLL VENOUS BLD VENIPUNCTURE: CPT

## 2020-06-26 PROCEDURE — 59025 PR FETAL 2N-STRESS TEST: ICD-10-PCS | Mod: 26,,, | Performed by: OBSTETRICS & GYNECOLOGY

## 2020-06-26 PROCEDURE — 99284 PR EMERGENCY DEPT VISIT,LEVEL IV: ICD-10-PCS | Mod: 25,,, | Performed by: OBSTETRICS & GYNECOLOGY

## 2020-06-26 PROCEDURE — 93010 EKG 12-LEAD: ICD-10-PCS | Mod: ,,, | Performed by: INTERNAL MEDICINE

## 2020-06-26 PROCEDURE — 86901 BLOOD TYPING SEROLOGIC RH(D): CPT

## 2020-06-26 PROCEDURE — 59025 FETAL NON-STRESS TEST: CPT | Mod: 26,,, | Performed by: OBSTETRICS & GYNECOLOGY

## 2020-06-26 PROCEDURE — 93010 ELECTROCARDIOGRAM REPORT: CPT | Mod: ,,, | Performed by: INTERNAL MEDICINE

## 2020-06-26 PROCEDURE — 80053 COMPREHEN METABOLIC PANEL: CPT

## 2020-06-26 PROCEDURE — U0002 COVID-19 LAB TEST NON-CDC: HCPCS

## 2020-06-26 PROCEDURE — 59025 FETAL NON-STRESS TEST: CPT

## 2020-06-26 PROCEDURE — 99284 EMERGENCY DEPT VISIT MOD MDM: CPT | Mod: 25,,, | Performed by: OBSTETRICS & GYNECOLOGY

## 2020-06-26 PROCEDURE — 93005 ELECTROCARDIOGRAM TRACING: CPT | Mod: 59

## 2020-06-26 RX ORDER — HYDROXYZINE HYDROCHLORIDE 25 MG/1
25 TABLET, FILM COATED ORAL 3 TIMES DAILY PRN
Qty: 30 TABLET | Refills: 0 | Status: SHIPPED | OUTPATIENT
Start: 2020-06-26

## 2020-06-27 LAB — BACTERIA SPEC AEROBE CULT: NORMAL

## 2020-06-27 NOTE — ED PROVIDER NOTES
Encounter Date: 2020       History     Chief Complaint   Patient presents with    Shortness of Breath    Tachycardia     31 yo  @ 36+ wga presents with shortness of breath and feeling her heart racing.  This has been occurring for just 1 day.  She monitored her vitals with the Connected Mom and noted that her blood pressure was elevated and her pulse elevated in the 120s-140s when she feels like this.  So, she came in .  She reports also some mid-sternal chest pain when this occurs.  She has NEVER had anything like this before.  She had to wait about 2 hours in the waiting area and she noted the symptoms off and on during that time. At the time of initial evaluation she is NOT feeling these symptoms.         Prenatal care has been with Dr Miner.  Recently complicated by a left axillary abscess that was I&D'd this week and patient started on oral Clindamycin.        Review of patient's allergies indicates:  No Known Allergies  Past Medical History:   Diagnosis Date    Anemia     Thyroid disease      Past Surgical History:   Procedure Laterality Date    NASAL SEPTUM SURGERY       Family History   Problem Relation Age of Onset    Diabetes Maternal Grandmother      Social History     Tobacco Use    Smoking status: Never Smoker    Smokeless tobacco: Never Used   Substance Use Topics    Alcohol use: Not Currently    Drug use: Never     Review of Systems   Constitutional: Negative for activity change, appetite change, chills, fatigue, fever and unexpected weight change.   HENT: Negative for congestion, facial swelling and sore throat.    Eyes: Negative.    Respiratory: Positive for shortness of breath. Negative for cough, choking, chest tightness and wheezing.    Cardiovascular: Positive for chest pain and palpitations. Negative for leg swelling.   Gastrointestinal: Negative for abdominal pain, diarrhea, nausea and vomiting.   Genitourinary: Negative for difficulty urinating, dysuria, frequency and  pelvic pain.   Musculoskeletal: Negative for back pain and myalgias.   Skin: Positive for wound (left axilla abscess packed). Negative for rash.   Neurological: Negative for dizziness, weakness, light-headedness, numbness and headaches.   Hematological: Negative for adenopathy. Does not bruise/bleed easily.   Psychiatric/Behavioral: Positive for sleep disturbance. Negative for agitation, confusion, decreased concentration, hallucinations, self-injury and suicidal ideas. The patient is nervous/anxious. The patient is not hyperactive.        Physical Exam     Initial Vitals   BP Pulse Resp Temp SpO2   06/26/20 1900 06/26/20 1904 06/26/20 1900 06/26/20 1900 06/26/20 1907   112/70 77 16 98.2 °F (36.8 °C) 99 %      MAP       --                Physical Exam    Vitals reviewed.  Constitutional: She appears well-developed and well-nourished. No distress.   HENT:   Head: Normocephalic and atraumatic.   Nose: Nose normal.   Eyes: Conjunctivae and EOM are normal. Right eye exhibits no discharge. Left eye exhibits no discharge.   Neck: Neck supple.   Cardiovascular: Normal rate, regular rhythm and intact distal pulses.   No murmur heard.  Pulmonary/Chest: Breath sounds normal. No respiratory distress. She has no wheezes. She has no rhonchi. She has no rales. She exhibits no tenderness.   Abdominal: Soft. There is no abdominal tenderness.   Gravid; size = dates   Musculoskeletal: No tenderness or edema.   Neurological: She is alert and oriented to person, place, and time. She has normal strength and normal reflexes.   Skin: Skin is warm and dry. Capillary refill takes less than 2 seconds. Abscess (healing well left axilla) noted.   Psychiatric: She has a normal mood and affect. Her behavior is normal. Judgment and thought content normal.     OB LABOR EXAM:                       Comments: Pelvic deferred       ED Course   Obtain Fetal nonstress test (NST)    Date/Time: 6/26/2020 7:54 PM  Performed by: Dixie Manning,  "MD  Authorized by: Edgar Kong MD     Nonstress Test:     Variability:  6-25 BPM    Decelerations:  None    Accelerations:  15 bpm    Acoustic Stimulator: No      Baseline:  140    Uterine Irritability: No      Contractions:  Irregular  Biophysical Profile:     Nonstress Test Interpretation: reactive      Overall Impression:  Reassuring      Labs Reviewed   CBC W/ AUTO DIFFERENTIAL - Abnormal; Notable for the following components:       Result Value    RBC 5.41 (*)     Hemoglobin 10.1 (*)     Hematocrit 32.7 (*)     Mean Corpuscular Volume 60 (*)     Mean Corpuscular Hemoglobin 18.7 (*)     Mean Corpuscular Hemoglobin Conc 30.9 (*)     RDW 15.9 (*)     Immature Granulocytes 1.1 (*)     Immature Grans (Abs) 0.07 (*)     All other components within normal limits   SARS-COV-2 RNA AMPLIFICATION, QUAL   COMPREHENSIVE METABOLIC PANEL   TROPONIN I   POCT URINALYSIS, DIPSTICK OR TABLET REAGENT, AUTOMATED, WITH MICROSCOP   TYPE & SCREEN     EKG Readings: (Independently Interpreted)   Initial Reading: No STEMI. Rhythm: Normal Sinus Rhythm. Ectopy: No Ectopy. Conduction: Normal.       Imaging Results    None          Medical Decision Making:   History:   Old Medical Records: I decided to obtain old medical records.  Old Records Summarized: records from clinic visits.       <> Summary of Records: Prenatal records reviewed; CBC with microcytosis (MCV 58-60) ; no dx of thallaseemia, etc.  Dx of "thyroid disorder" but normal TSH this pregnancy.   Initial Assessment:   Palpitations  Shortness of breath with chest pain on inspiration  Differential Diagnosis:   COVID-19 infection  HTN of pregnancy  MI  Pleuritic chest pain  Anxiety  PE (less likely as lack of persistent tachycardia, or hypoxemia)  SVT  Breathlessness of pregnancy  Clinical Tests:   Lab Tests: Ordered and Reviewed  The following lab test(s) were unremarkable: CBC, CMP, Urinalysis and Troponin  Medical Tests: Ordered and Reviewed  ED Management:  Patient " evaluated.  No evidence of MI; COVID-19 testing negative.   Patient had an episode while here  - pulse increased to 140, O2 saturation remained 100 and BP remained normal. Patient was able to slow breathing and pulse rate normalized to 90s after short time.     Patient counseled on possible causes; at this time I do not find anything worrisome on her testing. she does report feeling more anxiety/worrying lately and having difficulty sleeping.  We discussed trial of hydroxizine for anxiety/sleep.  She will try.      If the palpitations/tachycardia continue to recur, she may benefit from Cardiology eval/Holter Monitoring.                                   Clinical Impression:       ICD-10-CM ICD-9-CM   1. Anxiety  F41.9 300.00   2. Palpitations  R00.2 785.1   3. Shortness of breath due to pregnancy in third trimester  O26.893 646.83    R06.02 786.05   4. Pregnancy with 36 completed weeks gestation  Z3A.36 V22.2                                Dixie Manning MD  06/28/20 1001       Dixie Manning MD  07/02/20 9785

## 2020-06-27 NOTE — DISCHARGE INSTRUCTIONS
Please return to Labor and Delivery if you experience any leaking of fluid (like your water bag broke), vaginal bleeding (soaking more than 1 pad per hour), decreased fetal movement, and/or having contractions every 2-5 minutes for 2 hours.     Labor and Delivery: [phone]

## 2020-06-29 ENCOUNTER — TELEPHONE (OUTPATIENT)
Dept: OBSTETRICS AND GYNECOLOGY | Facility: CLINIC | Age: 30
End: 2020-06-29

## 2020-06-29 NOTE — TELEPHONE ENCOUNTER
----- Message from Dominique Guajardo sent at 6/29/2020 11:10 AM CDT -----  Pt is requesting a call back to discuss her birthing plan.     Pt stated on 6/26 she went to the ED. Based off of what she was told she has anxiety. She decided to move home to [place] to have a bigger support system as she has no one here.     Pt would like to speak with Dr. Miner to discuss further care and how to go about it.     Pt can be reached at- [phone]

## 2020-06-29 NOTE — TELEPHONE ENCOUNTER
Left message to patient to call the office at [phone]. Returned call regarding moving out of state and transferring care elsewhere.

## 2020-07-02 ENCOUNTER — TELEPHONE (OUTPATIENT)
Dept: OBSTETRICS AND GYNECOLOGY | Facility: CLINIC | Age: 30
End: 2020-07-02

## 2020-07-02 NOTE — TELEPHONE ENCOUNTER
Spoke to patient. She states she has moved to [place] due to having more physical support in her pregnancy as she does not have relatives here. Patient states that release of information sent to the office to transfer records to [facility].

## 2020-07-02 NOTE — TELEPHONE ENCOUNTER
----- Message from Aniya Herman sent at 7/2/2020  3:58 PM CDT -----  Patient is calling in to let provider know that she is faxing over a medical release form and would like staff to contact her soon as received. Patient contact number [phone].

## 2025-01-20 NOTE — PROGRESS NOTES
Patient is calling to schedule procedure. Please call patient back further discuss.    Pt with area under left axilla that is swollen, red and tender to touch.  Reports that she had this happen last pregnancy around the same time.  Denies any fever/chills.  Has hx of hydradenitis.  On exam, has a 3 x 3cm swollen, erythematous abscess under left axilla.  Small amount of drainage noted.  Area was numbed with lidocaine with epinephrine, a 1cm incision made with 11 blade and area was drained.  Large amount of purulent drainage noted.  Area was covered with gauze.  Will put on clindamycin for antibiotics and have follow up next week to make sure does not worsen.  Too late in pregnancy for Bactrim.  Discussed referral to derm for hydradenitis after delivery.     From a pregnancy standpoint doing well. Denies regular CTX, VB, VD, LOF.  + FM.  Continue PNV.  Labor precautions reviewed.  Pt to RTC in 1 week.  GBS today.  Will do 3rd trim labs next visit.